# Patient Record
Sex: FEMALE | Race: WHITE | HISPANIC OR LATINO | Employment: FULL TIME | URBAN - METROPOLITAN AREA
[De-identification: names, ages, dates, MRNs, and addresses within clinical notes are randomized per-mention and may not be internally consistent; named-entity substitution may affect disease eponyms.]

---

## 2017-07-10 ENCOUNTER — ALLSCRIPTS OFFICE VISIT (OUTPATIENT)
Dept: OTHER | Facility: OTHER | Age: 47
End: 2017-07-10

## 2017-07-10 DIAGNOSIS — Z12.31 ENCOUNTER FOR SCREENING MAMMOGRAM FOR MALIGNANT NEOPLASM OF BREAST: ICD-10-CM

## 2017-07-31 ENCOUNTER — HOSPITAL ENCOUNTER (OUTPATIENT)
Dept: RADIOLOGY | Age: 47
Discharge: HOME | End: 2017-07-31
Payer: COMMERCIAL

## 2017-07-31 DIAGNOSIS — Z12.31 ENCOUNTER FOR SCREENING MAMMOGRAM FOR MALIGNANT NEOPLASM OF BREAST: ICD-10-CM

## 2017-07-31 PROCEDURE — 77063 BREAST TOMOSYNTHESIS BI: CPT

## 2017-07-31 PROCEDURE — G0202 SCR MAMMO BI INCL CAD: HCPCS

## 2018-01-10 NOTE — RESULT NOTES
Verified Results  (1) TISSUE EXAM [id number] 08:51AM Jesse Duong     Test Name Result Flag Reference   LAB AP CASE REPORT (Report)     Surgical Pathology Report             Case: [id number]                   Authorizing Provider: Masood Tellez DO    Collected:      07/19/2016 5891        Ordering Location:   [facility]    Received:      07/20/2016 830 [facility]                            Pathologist:      Yared Mercado MD                                 Specimen:  Stomach, Stomach body normal hx of H pylori   LAB AP FINAL DIAGNOSIS      A  Stomach, body (biopsy):  - Chronic inactive oxyntic gastritis  - No H pylori identified on immunohistochemistry stain  - No intestina metaplasia or dysplasia   Electronically signed by Yared Mercado MD on 7/22/2016 at 12:11 PM   LAB AP SURGICAL ADDITIONAL INFORMATION (Report)     These tests were developed and their performance characteristics   determined by [facility] or [employer]  They may not be cleared or approved by the [place]  Food and   Drug Administration  The FDA has determined that such clearance or   approval is not necessary  These tests are used for clinical purposes  They should not be regarded as investigational or for research  This   laboratory has been approved by CLIA 88, designated as a high-complexity   laboratory and is qualified to perform these tests  LAB AP GROSS DESCRIPTION (Report)     A  The specimen is received in formalin, labeled with the patient's name   and medical record number, and is designated stomach body normal history   of H  pylori  The specimen consists of several, rubbery, tan-brown tissue   fragments measuring 1 0 x 1 0 x 0 2 cm in aggregate dimension  Entirely   submitted  One cassette      Note: The estimated total formalin fixation time based upon information   provided by the submitting clinician and the standard processing schedule   is 43 5 hours     SRS   LAB AP CLINICAL INFORMATION (Report)     stomach body normal hx of h pylori    Esophagus: The esophagus appeared to be normal  Stomach: The   stomach appeared to be normal  A cold forceps biopsy was taken from the   body of the stomach   Duodenum: The 1st portion of the duodenum and 2nd   portion of the duodenum appeared to be normal    stomach body normal hx of h pylori

## 2018-01-14 VITALS
DIASTOLIC BLOOD PRESSURE: 68 MMHG | BODY MASS INDEX: 31.91 KG/M2 | SYSTOLIC BLOOD PRESSURE: 116 MMHG | WEIGHT: 152 LBS | HEIGHT: 58 IN

## 2018-01-16 NOTE — RESULT NOTES
Message   Recorded as Task   Date: 02/26/2016 12:24 PM, Created By: Regla Rodríguez   Task Name: Follow Up   Assigned To: Isaac Pena   Regarding Patient: Dejan Magaña, Status: In Progress   PeterTorycjkimberly Bustamanteing - 26 Feb 2016 12:24 PM     TASK CREATED  Ultrasound report of the abdomen shows liver okay gallbladder okay except for a small gallbladder polyp which is of no clinical significance according to the radiologist's report  I hope she feels better on the omeprazole  And continue follow-up with the gastroenterology referral    We'll see her in follow-up   Ning Brandon - 01 Mar 2016 1:43 PM     TASK EDITED  I left a message for Henny to call back  Zuleyka Purcell - 01 Mar 2016 1:43 PM     TASK IN PROGRESS   Zuleyka Purcell - 02 Mar 2016 4:00 PM     TASK EDITED  I reviewed the ultrasound report with Henny   She has an appt scheduled for 6/9/19 with the gastroenterologist         Verified Results  (Q) Ermelinda [facility] 72  10:30AM Regla Rodríguez     Test Name Result Flag Reference   HELICOBACTER PYLORI AG,$EIA, STOOL  A    HELICOBACTER PYLORI AG, EIA, STOOL         MICRO NUMBER:      [id number]    TEST STATUS:       FINAL    SPECIMEN SOURCE:   STOOL    SPECIMEN QUALITY:  ADEQUATE    RESULT:            Detected

## 2018-01-18 NOTE — RESULT NOTES
Message   Recorded as Task   Date: 02/29/2016 06:26 PM, Created By: Radha Smith   Task Name: Follow Up   Assigned To: Hema Herman   Regarding Patient: Leona Gross, Status: In Progress   Sadie Yee - 29 Feb 2016 6:26 PM     TASK CREATED  Stool is positive for H  pylori  Treatment with Prevpac    We can do the following    Omeprazole 40 mg twice a day for 2 weeks  amoxicillin 1 g twice a day for 2 weeks  Biaxin 500 twice a day for 2 weeks    check the above with the Prevpac protocol make sure if it's X line  though that patient most likely a bacterial is causing part of the pain and we'll treat her with that and then after the above she continues on omeprazole 40 mg daily until she see the gastroenterologist the gastroenterologist will be aware of her bacteria in her stomach and we'll do an EGD in the near future   Ildefonsochristophe Pemberton - 01 Mar 2016 1:43 PM     TASK EDITED  I left a message for Henny to call back  Madelyn Pemberton - 01 Mar 2016 1:43 PM     TASK IN PROGRESS   Madelyn Pemberton - 46 Mar 2016 4:18 PM     TASK EDITED  I reviewed the result and instructions with Henny  I sent the medications to the pharmacy  She has a Gastroenterology appt scheduled in June          Verified Results  (Q) HELICOBACTER Ermelinda Hernández, STOOL [id number] 10:30AM Radha Smith     Test Name Result Flag Reference   HELICOBACTER PYLORI AG,$EIA, STOOL  A    HELICOBACTER PYLORI AG, EIA, STOOL         MICRO NUMBER:      [id number]    TEST STATUS:       FINAL    SPECIMEN SOURCE:   STOOL    SPECIMEN QUALITY:  ADEQUATE    RESULT:            Detected       Plan  H  pylori infection    · Amoxicillin 500 MG Oral Capsule; TAKE 2 CAPSULES TWICE DAILY UNTIL GONE   · Clarithromycin 500 MG Oral Tablet (Biaxin); TAKE 1 TABLET TWICE DAILY UNTIL  FINISHED   · Omeprazole 40 MG Oral Capsule Delayed Release; TAKE 1 CAPSULE TWICE  DAILY FOR 2 WEEKS, THEN 1 CAPSULE DAILY FOR 6 WEEKS

## 2019-11-07 PROCEDURE — 87070 CULTURE OTHR SPECIMN AEROBIC: CPT | Performed by: PHYSICIAN ASSISTANT

## 2019-11-07 PROCEDURE — 87205 SMEAR GRAM STAIN: CPT | Performed by: PHYSICIAN ASSISTANT

## 2019-11-08 ENCOUNTER — LAB REQUISITION (OUTPATIENT)
Dept: LAB | Facility: HOSPITAL | Age: 49
End: 2019-11-08
Payer: COMMERCIAL

## 2019-11-08 DIAGNOSIS — B95.8 UNSPECIFIED STAPHYLOCOCCUS AS THE CAUSE OF DISEASES CLASSIFIED ELSEWHERE: ICD-10-CM

## 2019-11-10 LAB
BACTERIA WND AEROBE CULT: NORMAL
GRAM STN SPEC: NORMAL